# Patient Record
(demographics unavailable — no encounter records)

---

## 2024-11-20 NOTE — DISCUSSION/SUMMARY
[FreeTextEntry1] : NEEDS TO VOID 2-3 /DAY MINIMUM IF NOT RTO GATORADE, BRAT DIET GO SLOW ZOFRAN RX  IF WORSENING RTO /ER RED FLAGS DISCUSSED

## 2024-11-20 NOTE — PHYSICAL EXAM
[Erythematous Oropharynx] : nonerythematous oropharynx [NL] : warm, clear [de-identified] : semi moist mucous membranes

## 2024-11-20 NOTE — HISTORY OF PRESENT ILLNESS
[de-identified] : Mom states pt feeling nauseous and vomiting and diarrhea since Sunday. No fever. No coughing no congestion as per mom  [FreeTextEntry6] : 3 days ago started with vomiting , abdominal pain and diarrhea says no more diarrhea but also not eating much since feels may vomit no fever no dysuria no sore throat drinking pedialtye and gatorade no recent travel says abdominal discomfort "in the middle" but laughing during exam  PT SAYS HE DIDNT NEED TO URINATE THIS AM WHEN HE WOKE UP

## 2025-02-14 NOTE — HISTORY OF PRESENT ILLNESS
[de-identified] : Fever this am of 103.6, cough, congestion and body aches x 3 days as per mom. Tylenol given this pm. No other c/o.  [FreeTextEntry6] : Fever since yesterday, body aches, congestion and coughs for a few days prior. Appetite is low but he is drinking well.

## 2025-02-14 NOTE — DISCUSSION/SUMMARY
[FreeTextEntry1] : Flu A pos. Treat fever with ibuprofen or acetaminophen as per 's instructions. Cool compresses for comfort. Increase fluids. Return if fever lasts >5 days or for new/worsening symptoms. Monitor for signs of dehydration such as low urine output or lethargy; seek immediate medical attention for these symptoms or fever >105.